# Patient Record
Sex: FEMALE | Race: ASIAN | Employment: FULL TIME | URBAN - METROPOLITAN AREA
[De-identification: names, ages, dates, MRNs, and addresses within clinical notes are randomized per-mention and may not be internally consistent; named-entity substitution may affect disease eponyms.]

---

## 2023-11-30 ENCOUNTER — HOSPITAL ENCOUNTER (EMERGENCY)
Facility: HOSPITAL | Age: 53
Discharge: HOME | End: 2023-12-01
Attending: EMERGENCY MEDICINE
Payer: COMMERCIAL

## 2023-11-30 DIAGNOSIS — S09.93XA FACIAL INJURY, INITIAL ENCOUNTER: ICD-10-CM

## 2023-11-30 DIAGNOSIS — S00.81XA ABRASION OF FACE, INITIAL ENCOUNTER: Primary | ICD-10-CM

## 2023-11-30 PROCEDURE — 99284 EMERGENCY DEPT VISIT MOD MDM: CPT

## 2023-12-01 ENCOUNTER — APPOINTMENT (OUTPATIENT)
Dept: CT IMAGING | Facility: HOSPITAL | Age: 53
End: 2023-12-01
Attending: EMERGENCY MEDICINE
Payer: COMMERCIAL

## 2023-12-01 VITALS
DIASTOLIC BLOOD PRESSURE: 62 MMHG | OXYGEN SATURATION: 94 % | RESPIRATION RATE: 16 BRPM | SYSTOLIC BLOOD PRESSURE: 102 MMHG | TEMPERATURE: 98 F | HEART RATE: 79 BPM

## 2023-12-01 PROCEDURE — 70486 CT MAXILLOFACIAL W/O DYE: CPT | Performed by: EMERGENCY MEDICINE

## 2023-12-01 RX ORDER — HYDROCODONE BITARTRATE AND ACETAMINOPHEN 5; 325 MG/1; MG/1
1 TABLET ORAL ONCE
Status: COMPLETED | OUTPATIENT
Start: 2023-12-01 | End: 2023-12-01

## 2023-12-01 RX ORDER — HYDROCODONE BITARTRATE AND ACETAMINOPHEN 5; 325 MG/1; MG/1
1-2 TABLET ORAL EVERY 6 HOURS PRN
Qty: 10 TABLET | Refills: 0 | Status: SHIPPED | OUTPATIENT
Start: 2023-12-01 | End: 2023-12-06

## 2023-12-01 NOTE — ED QUICK NOTES
Patient reports she was walking outside and had a trip and fall on uneven pavement. Patient fell face first, denies LOC. Denies nausea or vomiting. Laceration noted above upper lip. Abrasion to bridge of nose and cheeks. Bleeding controlled at this time. Denies blood thinners.

## 2023-12-01 NOTE — ED INITIAL ASSESSMENT (HPI)
Slip and fall this afternoon walking home. Laceration to upper lip. Multiple scrapes throughout face. Swelling to left upper eyelid, upper lip and nose. She was wearing glasses. Lenses are intact but frame is bent. Denies loc or thinners. Wounds bandaged in triage. Bleeding controlled.

## (undated) NOTE — LETTER
Date & Time: 12/1/2023, 2:09 AM  Patient: Josue Ortiz  Encounter Provider(s):    Olivia Bergeron DO       To Whom It May Concern:    Josue Ortiz was seen and treated in our department on 11/30/2023. She should not return to work until 12/04/23 .     If you have any questions or concerns, please do not hesitate to call.        _____________________________  Physician/[facility] Signature